# Patient Record
Sex: FEMALE | Race: WHITE | Employment: OTHER | URBAN - METROPOLITAN AREA
[De-identification: names, ages, dates, MRNs, and addresses within clinical notes are randomized per-mention and may not be internally consistent; named-entity substitution may affect disease eponyms.]

---

## 2017-01-09 DIAGNOSIS — E03.4 HYPOTHYROIDISM DUE TO ACQUIRED ATROPHY OF THYROID: ICD-10-CM

## 2017-01-09 RX ORDER — LEVOTHYROXINE SODIUM 0.07 MG/1
TABLET ORAL
Qty: 90 TAB | Refills: 4 | Status: SHIPPED | OUTPATIENT
Start: 2017-01-09 | End: 2017-02-01 | Stop reason: SDUPTHER

## 2017-01-09 NOTE — TELEPHONE ENCOUNTER
Was the patient seen in the last year in this department? No     Does patient have an active prescription for medications requested? No     Received Request Via: Patient     Patient has an appt scheduled for 2/1/17 but stated she will run out of medication before then.

## 2017-01-10 NOTE — TELEPHONE ENCOUNTER
Phone Number Called: [phone] (home)     Message: Patient notified of rx sent to pharmacy    Left Message for patient to call back: no

## 2017-01-11 ENCOUNTER — APPOINTMENT (OUTPATIENT)
Dept: MEDICAL GROUP | Age: 62
End: 2017-01-11
Payer: COMMERCIAL

## 2017-02-01 ENCOUNTER — OFFICE VISIT (OUTPATIENT)
Dept: MEDICAL GROUP | Age: 62
End: 2017-02-01
Payer: COMMERCIAL

## 2017-02-01 VITALS
OXYGEN SATURATION: 95 % | RESPIRATION RATE: 16 BRPM | WEIGHT: 136.4 LBS | TEMPERATURE: 98.3 F | DIASTOLIC BLOOD PRESSURE: 84 MMHG | HEART RATE: 77 BPM | SYSTOLIC BLOOD PRESSURE: 146 MMHG | HEIGHT: 66 IN | BODY MASS INDEX: 21.92 KG/M2

## 2017-02-01 DIAGNOSIS — E78.00 PURE HYPERCHOLESTEROLEMIA: ICD-10-CM

## 2017-02-01 DIAGNOSIS — Z00.00 HEALTHCARE MAINTENANCE: ICD-10-CM

## 2017-02-01 DIAGNOSIS — Z85.3 HX: BREAST CANCER: ICD-10-CM

## 2017-02-01 DIAGNOSIS — Z12.11 SCREEN FOR COLON CANCER: ICD-10-CM

## 2017-02-01 DIAGNOSIS — E03.4 HYPOTHYROIDISM DUE TO ACQUIRED ATROPHY OF THYROID: ICD-10-CM

## 2017-02-01 DIAGNOSIS — Z98.890 S/P COLONOSCOPY: ICD-10-CM

## 2017-02-01 PROCEDURE — 99396 PREV VISIT EST AGE 40-64: CPT | Performed by: INTERNAL MEDICINE

## 2017-02-01 RX ORDER — LEVOTHYROXINE SODIUM 0.07 MG/1
TABLET ORAL
Qty: 90 TAB | Refills: 4
Start: 2017-02-01 | End: 2018-04-05 | Stop reason: SDUPTHER

## 2017-02-01 ASSESSMENT — PATIENT HEALTH QUESTIONNAIRE - PHQ9: CLINICAL INTERPRETATION OF PHQ2 SCORE: 0

## 2017-02-01 NOTE — PROGRESS NOTES
Chief Complaint:     Taisha Franco is a 61 y.o. female who presents for annual exam    Pt has GYN provider: yes  Last colonoscopy:    Bone density test:N\A  Gardasil:N\A   Last Td:    Regular exercise: yes      She  has a past medical history of Breast cancer (CMS-HCC) (1991) and Hypothyroidism.  She  has past surgical history that includes mastectomy (1991) and chemotherapy, unspecified procedure.  Current Outpatient Prescriptions   Medication Sig Dispense Refill   • levothyroxine (SYNTHROID) 75 MCG Tab TAKE ONE TABLET BY MOUTH DAILY -GENERICFOR SYNTHROID 90 Tab 4     No current facility-administered medications for this visit.     Social History   Substance Use Topics   • Smoking status: Never Smoker    • Smokeless tobacco: Never Used   • Alcohol Use: Yes       Review Of Systems  Denies fever, chills, or sweats, unexplained weight changes  Skin: negative for rash, changing moles, abnormal pigmentation, hair or nail changes.  Eyes: negative for visual blurring, double vision, eye pain, floaters and discharge from eyes  Ears/Nose/Throat: negative for tinnitus, vertigo, oral or dental problem, hoarseness, frequent URI's, sinus trouble, persistent sore throat  Respiratory: negative for persistent cough, hemoptysis, dyspnea, wheezing  Cardiovascular: negative for palpitations, tachycardia, irregular heart beat, chest pain or pressure or peripheral edema.  Breast: Denies breast tenderness, mass,  changes in size or contour, or abnormal cyclic discomfort.  Gastrointestinal: negative for dysphagia or odynophagia, nausea, heartburn or reflux, abdominal pain, hemorrhoids, constipation or diarrhea, black stool or bloody stool  Genitourinary: negative for nocturia, dysuria, frequency, incontinence, abnormal vaginal discharge, dysparunia or abnormal vaginal bleeding  Musculoskeletal: negative for joint swelling and muscle pain/ soreness  Neurologic: negative for new or changing headaches, new weakness  "tremor  Psychiatric: negative for mood or sleep disturbance, anxiety, depression, sexual difficulties  Hematologic/Lymphatic/Immunologic: negative for pallor, unusual bruising, swollen glands, HIV risk factors  Endocrine: negative for temperature intolerance, polydipsia, polyuria.       PHYSICAL EXAMINATION:  Blood pressure 146/84, pulse 77, temperature 36.8 °C (98.3 °F), resp. rate 16, height 1.676 m (5' 5.98\"), weight 61.871 kg (136 lb 6.4 oz), SpO2 95 %.  Body mass index is 22.03 kg/(m^2).  Wt Readings from Last 4 Encounters:   02/01/17 61.871 kg (136 lb 6.4 oz)   11/11/15 61.326 kg (135 lb 3.2 oz)   09/18/14 62.869 kg (138 lb 9.6 oz)   04/15/14 62.143 kg (137 lb)       Constitutional: Alert, no distress.  Skin: Warm, dry, good turgor, no rashes or suspicious lesions in visible areas.  Eye: pupils equal, round and reactive to light, conjunctivae clear, lids normal.  ENMT: Lips without lesions, good dentition, oropharynx clear. Pinnae skin normal with no lesions. TM pearly gray with normal light reflex.   Neck: supple, no masses. No anterior or supraclavicular adenopathy. No carotid bruits no thyromegaly.  Respiratory: Unlabored respiratory effort, lungs clear to auscultation, no wheezes, no ronchi.  Cardiovascular: Normal S1, S2, no murmur, no peripheral edema.  Abdomen: no CVAT abdomen Soft, non-tender, no masses, no hepatosplenomegaly.  Psych: Alert and oriented x3, normal affect and mood.  Musc/Skel: 5/5 strength and normal motion UE and LE proximal and distal.        ASSESSMENT/PLAN:  1. Hypothyroidism due to acquired atrophy of thyroid -Under good control. Continue same regimen.   levothyroxine (SYNTHROID) 75 MCG Tab    TSH   2. S/P colonoscopy- neg DHA, 2007  REFERRAL TO GASTROENTEROLOGY   3. Screen for colon cancer  REFERRAL TO GASTROENTEROLOGY   4. HTN, white coat  -Under good control. Continue same regimen.      5. Pure hypercholesterolemia- mild no meds  -Under good control. Continue same regimen.   " LIPID PROFILE    CBC WITH DIFFERENTIAL    COMP METABOLIC PANEL   6. HX: breast cancer-1991; left mastectomy; 3 pos ax nodes; ER/HER +; chemo rx 6 mos, tamoxifen x 10 yr; no recurrence   -Under good control. Continue same regimen.       HCM:      Labs ordered  Immunizations per orders  Pt counseled about skin care, diet, supplements, and exercise.  Next office visit for recheck of chronic medical conditions is due in  1 year     30 minute face-to-face encounter took place today.  More than half of this time was spent in the coordination of care of the above problems, as well as counseling.

## 2017-02-01 NOTE — MR AVS SNAPSHOT
"        Taisha Franco   2017 10:40 AM   Office Visit   MRN: [id number]    Department:  [address] [place] [address]   Dept Phone:  [phone]    Description:  Female : 1955   Provider:  Celestino Harrison M.D.           Reason for Visit     Annual Exam preventative    Hypothyroidism evaluation of lab work results      Allergies as of 2017     No Known Allergies      You were diagnosed with     Hypothyroidism due to acquired atrophy of thyroid   [1208338]       S/P colonoscopy   [763146]       Screen for colon cancer   [944027]       HTN, white coat   [670409]       Pure hypercholesterolemia   [272.0.ICD-9-CM]       HX: breast cancer   [405422]         Vital Signs     Blood Pressure Pulse Temperature Respirations Height Weight    146/84 mmHg 77 36.8 °C (98.3 °F) 16 1.676 m (5' 5.98\") 61.871 kg (136 lb 6.4 oz)    Body Mass Index Oxygen Saturation Smoking Status             22.03 kg/m2 95% Never Smoker          Basic Information     Date Of Birth Sex Race Ethnicity Preferred Language    1955 Female White Unknown English      Your appointments     2018  2:40 PM   ANNUAL EXAM PREVENTATIVE with Celestino Harrison M.D.   [address] [address] [phone]              Problem List              ICD-10-CM Priority Class Noted - Resolved    Hypothyroidism due to acquired atrophy of thyroid E03.4   2013 - Present    Pure hypercholesterolemia- mild no meds E78.00   2013 - Present    HX: breast cancer-; left mastectomy; 3 pos ax nodes; ER/HER +; chemo rx 6 mos, tamoxifen x 10 yr; no recurrence Z85.3   2014 - Present    HTN, white coat R03.0   2015 - Present    S/P colonoscopy- neg DHA,  Z98.890   2015 - Present      Health Maintenance        Date Due Completion Dates    IMM DTaP/Tdap/Td Vaccine (1 - Tdap) 1974 ---    IMM ZOSTER VACCINE 2015 ---    IMM INFLUENZA (1) 2016 ---    COLONOSCOPY " 4/2/2017 4/2/2007    MAMMOGRAM 6/20/2017 6/20/2016, 6/17/2015, 6/16/2014, 6/26/2013, 6/26/2013, 6/26/2013, 6/26/2013, 6/26/2013, 6/14/2013, 6/13/2012, 6/26/2006, 6/23/2005, 6/11/2004    PAP SMEAR 6/12/2018 6/12/2015            Current Immunizations     No immunizations on file.      Below and/or attached are the medications your provider expects you to take. Review all of your home medications and newly ordered medications with your provider and/or pharmacist. Follow medication instructions as directed by your provider and/or pharmacist. Please keep your medication list with you and share with your provider. Update the information when medications are discontinued, doses are changed, or new medications (including over-the-counter products) are added; and carry medication information at all times in the event of emergency situations     Allergies:  No Known Allergies          Medications  Valid as of: February 01, 2017 - 11:19 AM    Generic Name Brand Name Tablet Size Instructions for use    Levothyroxine Sodium (Tab) SYNTHROID 75 MCG TAKE ONE TABLET BY MOUTH DAILY -GENERICFOR SYNTHROID        .                 Medicines prescribed today were sent to:     [id number] [place] [address] [facility] [place]    [address] [facility] [address]    Phone: [phone] Fax: [phone]    Open 24 Hours?: No      Medication refill instructions:       If your prescription bottle indicates you have medication refills left, it is not necessary to call your provider’s office. Please contact your pharmacy and they will refill your medication.    If your prescription bottle indicates you do not have any refills left, you may request refills at any time through one of the following ways: The online [employer] system (except Urgent Care), by calling your provider’s office, or by asking your pharmacy to contact your provider’s office with a refill request. Medication refills are processed only during regular business hours and may not be  available until the next business day. Your provider may request additional information or to have a follow-up visit with you prior to refilling your medication.   *Please Note: Medication refills are assigned a new Rx number when refilled electronically. Your pharmacy may indicate that no refills were authorized even though a new prescription for the same medication is available at the pharmacy. Please request the medicine by name with the pharmacy before contacting your provider for a refill.        Your To Do List     Future Labs/Procedures Complete By Expires    CBC WITH DIFFERENTIAL  As directed 2/1/2018    COMP METABOLIC PANEL  As directed 2/1/2018    LIPID PROFILE  As directed 2/1/2018    TSH  As directed 2/1/2018      Referral     A referral request has been sent to our patient care coordination department. Please allow 3-5 business days for us to process this request and contact you either by phone or mail. If you do not hear from us by the 5th business day, please call us at [phone].           [employer] Access Code: Activation code not generated  Current [employer] Status: Active

## 2017-06-26 ENCOUNTER — HOSPITAL ENCOUNTER (OUTPATIENT)
Dept: RADIOLOGY | Facility: MEDICAL CENTER | Age: 62
End: 2017-06-26
Attending: INTERNAL MEDICINE
Payer: COMMERCIAL

## 2017-06-26 DIAGNOSIS — Z12.31 ENCOUNTER FOR MAMMOGRAM TO ESTABLISH BASELINE MAMMOGRAM: ICD-10-CM

## 2017-06-26 PROCEDURE — G0202 SCR MAMMO BI INCL CAD: HCPCS

## 2017-11-07 ENCOUNTER — OFFICE VISIT (OUTPATIENT)
Dept: MEDICAL GROUP | Age: 62
End: 2017-11-07
Payer: COMMERCIAL

## 2017-11-07 VITALS
HEIGHT: 67 IN | TEMPERATURE: 98 F | WEIGHT: 140.4 LBS | DIASTOLIC BLOOD PRESSURE: 86 MMHG | BODY MASS INDEX: 22.03 KG/M2 | HEART RATE: 78 BPM | OXYGEN SATURATION: 100 % | SYSTOLIC BLOOD PRESSURE: 144 MMHG

## 2017-11-07 DIAGNOSIS — J01.80 ACUTE NON-RECURRENT SINUSITIS OF OTHER SINUS: Primary | ICD-10-CM

## 2017-11-07 DIAGNOSIS — Z23 NEED FOR VACCINATION: ICD-10-CM

## 2017-11-07 DIAGNOSIS — H01.139 ECZEMATOUS DERMATITIS OF EYELID, UNSPECIFIED LATERALITY: ICD-10-CM

## 2017-11-07 DIAGNOSIS — J30.9 ACUTE ALLERGIC RHINITIS, UNSPECIFIED SEASONALITY, UNSPECIFIED TRIGGER: ICD-10-CM

## 2017-11-07 PROCEDURE — 99214 OFFICE O/P EST MOD 30 MIN: CPT | Performed by: FAMILY MEDICINE

## 2017-11-07 RX ORDER — AMOXICILLIN AND CLAVULANATE POTASSIUM 875; 125 MG/1; MG/1
1 TABLET, FILM COATED ORAL 2 TIMES DAILY
Qty: 10 TAB | Refills: 0 | Status: SHIPPED | OUTPATIENT
Start: 2017-11-07 | End: 2017-11-12

## 2017-11-07 NOTE — PROGRESS NOTES
"Subjective:   CC: sinus pain    HPI:     Taisha Franco is a 62 y.o. female, established patient of the clinic, presents with the following concerns:     Pt presents with 6 weeks hx of nasal congestion, nasal discharge, eyelid itch, sinus pressure, and mild cough without fever, chills, ear pain, ear discharge, SOB, [place]. She tried saline and OTC Claritin D without relief. Couple days ago, she develops acute bilateral maxillary and frontal sinus tenderness. Pain is described as ache, 4/10, nothing make it worse or better. OTC analgesics failed to improve her symptoms.     Pt also c/o dry, itchy, and mildly erythematous eyelids. Denies changes in vision or trauma to the eyes.     Current medicines (including changes today)  Current Outpatient Prescriptions   Medication Sig Dispense Refill   • amoxicillin-clavulanate (AUGMENTIN) 875-125 MG Tab Take 1 Tab by mouth 2 times a day for 5 days. 10 Tab 0   • hydrocortisone 2.5 % Ointment Apply to affected area twice daily 30 g 0   • levothyroxine (SYNTHROID) 75 MCG Tab TAKE ONE TABLET BY MOUTH DAILY -GENERICFOR SYNTHROID 90 Tab 4     No current facility-administered medications for this visit.      She  has a past medical history of Breast cancer (CMS-HCC) (1991) and Hypothyroidism.    I personally reviewed patient's problem list, allergies, medications, family hx, social hx with patient and update EPIC.     REVIEW OF SYSTEMS:  CONSTITUTIONAL:  Denies night sweats, fatigue, malaise, lethargy, fever or chills.  RESPIRATORY:  Denies cough, wheeze, hemoptysis, or shortness of breath.  CARDIOVASCULAR:  Denies chest pains, palpitations, pedal edema     Objective:     Blood pressure 144/86, pulse 78, temperature 36.7 °C (98 °F), height 1.689 m (5' 6.5\"), weight 63.7 kg (140 lb 6.4 oz), SpO2 100 %. Body mass index is 22.32 kg/m².    Physical Exam:  Constitutional: awake, alert, in no distress.  Skin: Warm, dry, good turgor, no rashes, bruises, ulcers in visible areas.  - mild " bilateral upper and lower eyelids erythema, dry, flaky skin. No e/o trama or bruises.   Eye: conjunctiva clear, lids neg for edema or lesions.  ENMT: TM and auditory canals wnl. inflamed nasal mucosa with mild mucoid discharged. Lips without lesions, good dentition, hyperemic oropharynx w/o tonsillar hypertrophy. Maxillary and frontal sinuses are tender to percussion.   Neck: Trachea midline, no masses, no thyromegaly. No cervical or supraclavicular lymphadenopathy  Respiratory: Unlabored respiratory effort, lungs clear to auscultation, no wheezes, no rhonchi, no rales.  Cardiovascular: Normal S1, S2, no murmur, no pedal edema.  Psych: Oriented x3, affect and mood wnl, intact judgement and insight.       Assessment and Plan:   The following treatment plan was discussed    1. Acute sinusitis   Hx and exam consistent with acute sinusitis, poorly treated allergic rhinitis might be contributing to symptoms. Plan:   - amoxicillin-clavulanate (AUGMENTIN) 875-125 MG Tab; Take 1 Tab by mouth 2 times a day for 5 days.  Dispense: 10 Tab; Refill: 0  - treat AR as below    2. Eczematous dermatitis of eyelids, unspecified laterality  - hydrocortisone 2.5 % Ointment; Apply to affected area twice daily  Dispense: 30 g; Refill: 0  - emollient, cool compresses     3. Acute allergic rhinitis, unspecified seasonality, unspecified trigger  · Nasal saline irrigation 2-3 times per day to reduce allergen load  · Over-the-counter nasal steroid (Flonase, NasoNex)   · Over-the-counter anti-histamine PRN (Claritin, Allegra, etc)  · Home humidifier/air filter  · Trigger avoidance  · Follow up PRN    Andreea Chambers M.D.      Followup: Return for As needed.    Please note that this dictation was created using voice recognition software. I have made every reasonable attempt to correct obvious errors, but I expect that there are errors of grammar and possibly content that I did not discover before finalizing the note.

## 2017-11-18 LAB
ALBUMIN SERPL-MCNC: 4.5 G/DL (ref 3.6–4.8)
ALBUMIN/GLOB SERPL: 1.7 {RATIO} (ref 1.2–2.2)
ALP SERPL-CCNC: 61 IU/L (ref 39–117)
ALT SERPL-CCNC: 19 IU/L (ref 0–32)
AST SERPL-CCNC: 30 IU/L (ref 0–40)
BASOPHILS # BLD AUTO: 0.1 X10E3/UL (ref 0–0.2)
BASOPHILS NFR BLD AUTO: 2 %
BILIRUB SERPL-MCNC: 0.7 MG/DL (ref 0–1.2)
BUN SERPL-MCNC: 15 MG/DL (ref 8–27)
BUN/CREAT SERPL: 17 (ref 12–28)
CALCIUM SERPL-MCNC: 9.7 MG/DL (ref 8.7–10.3)
CHLORIDE SERPL-SCNC: 100 MMOL/L (ref 96–106)
CHOLEST SERPL-MCNC: 224 MG/DL (ref 100–199)
CO2 SERPL-SCNC: 24 MMOL/L (ref 18–29)
COMMENT 011824: ABNORMAL
CREAT SERPL-MCNC: 0.87 MG/DL (ref 0.57–1)
EOSINOPHIL # BLD AUTO: 0.1 X10E3/UL (ref 0–0.4)
EOSINOPHIL NFR BLD AUTO: 3 %
ERYTHROCYTE [DISTWIDTH] IN BLOOD BY AUTOMATED COUNT: 13.2 % (ref 12.3–15.4)
GFR SERPLBLD CREATININE-BSD FMLA CKD-EPI: 72 ML/MIN/1.73
GFR SERPLBLD CREATININE-BSD FMLA CKD-EPI: 83 ML/MIN/1.73
GLOBULIN SER CALC-MCNC: 2.7 G/DL (ref 1.5–4.5)
GLUCOSE SERPL-MCNC: 89 MG/DL (ref 65–99)
HCT VFR BLD AUTO: 48.1 % (ref 34–46.6)
HDLC SERPL-MCNC: 89 MG/DL
HGB BLD-MCNC: 15.6 G/DL (ref 11.1–15.9)
IMM GRANULOCYTES # BLD: 0 X10E3/UL (ref 0–0.1)
IMM GRANULOCYTES NFR BLD: 0 %
IMMATURE CELLS  115398: ABNORMAL
LDLC SERPL CALC-MCNC: 118 MG/DL (ref 0–99)
LYMPHOCYTES # BLD AUTO: 1.7 X10E3/UL (ref 0.7–3.1)
LYMPHOCYTES NFR BLD AUTO: 48 %
MCH RBC QN AUTO: 32.2 PG (ref 26.6–33)
MCHC RBC AUTO-ENTMCNC: 32.4 G/DL (ref 31.5–35.7)
MCV RBC AUTO: 99 FL (ref 79–97)
MONOCYTES # BLD AUTO: 0.3 X10E3/UL (ref 0.1–0.9)
MONOCYTES NFR BLD AUTO: 8 %
MORPHOLOGY BLD-IMP: ABNORMAL
NEUTROPHILS # BLD AUTO: 1.3 X10E3/UL (ref 1.4–7)
NEUTROPHILS NFR BLD AUTO: 39 %
NRBC BLD AUTO-RTO: ABNORMAL %
PLATELET # BLD AUTO: 281 X10E3/UL (ref 150–379)
POTASSIUM SERPL-SCNC: 4.3 MMOL/L (ref 3.5–5.2)
PROT SERPL-MCNC: 7.2 G/DL (ref 6–8.5)
RBC # BLD AUTO: 4.84 X10E6/UL (ref 3.77–5.28)
SODIUM SERPL-SCNC: 139 MMOL/L (ref 134–144)
TRIGL SERPL-MCNC: 86 MG/DL (ref 0–149)
TSH SERPL DL<=0.005 MIU/L-ACNC: 1.97 UIU/ML (ref 0.45–4.5)
VLDLC SERPL CALC-MCNC: 17 MG/DL (ref 5–40)
WBC # BLD AUTO: 3.4 X10E3/UL (ref 3.4–10.8)

## 2017-11-21 ENCOUNTER — TELEPHONE (OUTPATIENT)
Dept: MEDICAL GROUP | Age: 62
End: 2017-11-21

## 2017-11-21 RX ORDER — DOXYCYCLINE HYCLATE 100 MG
100 TABLET ORAL 2 TIMES DAILY
Qty: 14 TAB | Refills: 0 | Status: SHIPPED | OUTPATIENT
Start: 2017-11-21 | End: 2017-11-28

## 2017-11-21 NOTE — TELEPHONE ENCOUNTER
1. Caller Name: Taisha Franco                                           Call Back Number: [phone] (home)         Patient approves a detailed voicemail message: N\A    Patient called and stated that her sinus infection was getting better and then it took a turn for the worse. She believes she needs more antibiotics but she thinks she needs a stronger dose.  Please advise.

## 2017-11-22 ENCOUNTER — APPOINTMENT (RX ONLY)
Dept: URBAN - METROPOLITAN AREA CLINIC 20 | Facility: CLINIC | Age: 62
Setting detail: DERMATOLOGY
End: 2017-11-22

## 2017-11-22 DIAGNOSIS — L71.0 PERIORAL DERMATITIS: ICD-10-CM

## 2017-11-22 DIAGNOSIS — L82.1 OTHER SEBORRHEIC KERATOSIS: ICD-10-CM

## 2017-11-22 DIAGNOSIS — D22 MELANOCYTIC NEVI: ICD-10-CM

## 2017-11-22 DIAGNOSIS — L81.4 OTHER MELANIN HYPERPIGMENTATION: ICD-10-CM

## 2017-11-22 DIAGNOSIS — D18.0 HEMANGIOMA: ICD-10-CM

## 2017-11-22 PROBLEM — D22.72 MELANOCYTIC NEVI OF LEFT LOWER LIMB, INCLUDING HIP: Status: ACTIVE | Noted: 2017-11-22

## 2017-11-22 PROBLEM — D22.71 MELANOCYTIC NEVI OF RIGHT LOWER LIMB, INCLUDING HIP: Status: ACTIVE | Noted: 2017-11-22

## 2017-11-22 PROBLEM — D22.62 MELANOCYTIC NEVI OF LEFT UPPER LIMB, INCLUDING SHOULDER: Status: ACTIVE | Noted: 2017-11-22

## 2017-11-22 PROBLEM — Z85.3 PERSONAL HISTORY OF MALIGNANT NEOPLASM OF BREAST: Status: ACTIVE | Noted: 2017-11-22

## 2017-11-22 PROBLEM — D22.5 MELANOCYTIC NEVI OF TRUNK: Status: ACTIVE | Noted: 2017-11-22

## 2017-11-22 PROBLEM — D22.39 MELANOCYTIC NEVI OF OTHER PARTS OF FACE: Status: ACTIVE | Noted: 2017-11-22

## 2017-11-22 PROBLEM — D22.61 MELANOCYTIC NEVI OF RIGHT UPPER LIMB, INCLUDING SHOULDER: Status: ACTIVE | Noted: 2017-11-22

## 2017-11-22 PROBLEM — D18.01 HEMANGIOMA OF SKIN AND SUBCUTANEOUS TISSUE: Status: ACTIVE | Noted: 2017-11-22

## 2017-11-22 PROBLEM — E03.9 HYPOTHYROIDISM, UNSPECIFIED: Status: ACTIVE | Noted: 2017-11-22

## 2017-11-22 PROCEDURE — 99214 OFFICE O/P EST MOD 30 MIN: CPT

## 2017-11-22 PROCEDURE — ? SUNSCREEN RECOMMENDATIONS

## 2017-11-22 PROCEDURE — ? ADDITIONAL NOTES

## 2017-11-22 PROCEDURE — ? COUNSELING

## 2017-11-22 PROCEDURE — ? PRESCRIPTION SAMPLES PROVIDED

## 2017-11-22 ASSESSMENT — LOCATION ZONE DERM
LOCATION ZONE: FACE
LOCATION ZONE: LEG
LOCATION ZONE: EYELID
LOCATION ZONE: TRUNK
LOCATION ZONE: ARM

## 2017-11-22 ASSESSMENT — LOCATION SIMPLE DESCRIPTION DERM
LOCATION SIMPLE: RIGHT CHEEK
LOCATION SIMPLE: RIGHT FOREARM
LOCATION SIMPLE: UPPER BACK
LOCATION SIMPLE: RIGHT POSTERIOR THIGH
LOCATION SIMPLE: RIGHT INFERIOR EYELID
LOCATION SIMPLE: LEFT POSTERIOR UPPER ARM
LOCATION SIMPLE: LEFT INFERIOR EYELID
LOCATION SIMPLE: RIGHT FOREHEAD
LOCATION SIMPLE: RIGHT POSTERIOR UPPER ARM
LOCATION SIMPLE: RIGHT PRETIBIAL REGION
LOCATION SIMPLE: LEFT FOREARM
LOCATION SIMPLE: LEFT POSTERIOR THIGH
LOCATION SIMPLE: RIGHT UPPER BACK
LOCATION SIMPLE: RIGHT LOWER BACK
LOCATION SIMPLE: LEFT PRETIBIAL REGION

## 2017-11-22 ASSESSMENT — LOCATION DETAILED DESCRIPTION DERM
LOCATION DETAILED: LEFT VENTRAL PROXIMAL FOREARM
LOCATION DETAILED: RIGHT LATERAL INFERIOR EYELID
LOCATION DETAILED: INFERIOR THORACIC SPINE
LOCATION DETAILED: RIGHT INFERIOR FOREHEAD
LOCATION DETAILED: RIGHT PROXIMAL PRETIBIAL REGION
LOCATION DETAILED: RIGHT INFERIOR MEDIAL MIDBACK
LOCATION DETAILED: LEFT DISTAL POSTERIOR THIGH
LOCATION DETAILED: RIGHT INFERIOR MEDIAL UPPER BACK
LOCATION DETAILED: RIGHT INFERIOR CENTRAL MALAR CHEEK
LOCATION DETAILED: RIGHT VENTRAL PROXIMAL FOREARM
LOCATION DETAILED: LEFT LATERAL INFERIOR PRESEPTAL REGION
LOCATION DETAILED: LEFT LATERAL PROXIMAL PRETIBIAL REGION
LOCATION DETAILED: RIGHT DISTAL POSTERIOR UPPER ARM
LOCATION DETAILED: RIGHT DISTAL POSTERIOR THIGH
LOCATION DETAILED: LEFT PROXIMAL POSTERIOR UPPER ARM
LOCATION DETAILED: RIGHT SUPERIOR UPPER BACK

## 2017-11-22 NOTE — PROCEDURE: ADDITIONAL NOTES
Additional Notes: pt states she is taking oral antibiotics prescribed by PCP with some improvement noted.

## 2018-04-05 ENCOUNTER — OFFICE VISIT (OUTPATIENT)
Dept: MEDICAL GROUP | Age: 63
End: 2018-04-05
Payer: COMMERCIAL

## 2018-04-05 VITALS
DIASTOLIC BLOOD PRESSURE: 80 MMHG | BODY MASS INDEX: 21.91 KG/M2 | HEART RATE: 98 BPM | SYSTOLIC BLOOD PRESSURE: 130 MMHG | TEMPERATURE: 99.1 F | OXYGEN SATURATION: 99 % | HEIGHT: 67 IN | WEIGHT: 139.6 LBS

## 2018-04-05 DIAGNOSIS — Z85.3 HX: BREAST CANCER: ICD-10-CM

## 2018-04-05 DIAGNOSIS — E78.00 PURE HYPERCHOLESTEROLEMIA: ICD-10-CM

## 2018-04-05 DIAGNOSIS — Z00.00 ROUTINE GENERAL MEDICAL EXAMINATION AT A HEALTH CARE FACILITY: ICD-10-CM

## 2018-04-05 DIAGNOSIS — I10 ESSENTIAL HYPERTENSION: ICD-10-CM

## 2018-04-05 DIAGNOSIS — E03.4 HYPOTHYROIDISM DUE TO ACQUIRED ATROPHY OF THYROID: ICD-10-CM

## 2018-04-05 PROCEDURE — 99396 PREV VISIT EST AGE 40-64: CPT | Performed by: INTERNAL MEDICINE

## 2018-04-05 RX ORDER — LEVOTHYROXINE SODIUM 0.07 MG/1
TABLET ORAL
Qty: 90 TAB | Refills: 4 | Status: SHIPPED | OUTPATIENT
Start: 2018-04-05 | End: 2019-05-08 | Stop reason: SDUPTHER

## 2018-04-05 ASSESSMENT — PATIENT HEALTH QUESTIONNAIRE - PHQ9: CLINICAL INTERPRETATION OF PHQ2 SCORE: 0

## 2018-04-05 ASSESSMENT — ENCOUNTER SYMPTOMS
PSYCHIATRIC NEGATIVE: 1
CONSTITUTIONAL NEGATIVE: 1
GASTROINTESTINAL NEGATIVE: 1
RESPIRATORY NEGATIVE: 1
CARDIOVASCULAR NEGATIVE: 1
EYES NEGATIVE: 1
MUSCULOSKELETAL NEGATIVE: 1
NEUROLOGICAL NEGATIVE: 1

## 2018-04-05 NOTE — PROGRESS NOTES
"Subjective:      Taisha Franco is a 62 y.o. female who presents with Annual Exam and Results  and  The patient is here for followup of chronic medical problems listed below. The patient is compliant with medications and having no side effects from them. Denies chest pain, abdominal pain, dyspnea, myalgias, or cough.   Patient Active Problem List   Diagnosis   • Hypothyroidism due to acquired atrophy of thyroid   • Pure hypercholesterolemia- mild no meds   • HX: breast cancer-1991; left mastectomy; 3 pos ax nodes; ER/HER +; chemo rx 6 mos, tamoxifen x 10 yr; no recurrence   • Essential hypertension- borderline, no meds, white coat   No Known Allergies              HPI    Review of Systems   Constitutional: Negative.    HENT: Negative.    Eyes: Negative.    Respiratory: Negative.    Cardiovascular: Negative.    Gastrointestinal: Negative.    Genitourinary: Negative.    Musculoskeletal: Negative.    Skin: Negative.    Neurological: Negative.    Endo/Heme/Allergies: Negative.    Psychiatric/Behavioral: Negative.           Objective:     /80   Pulse 98   Temp 37.3 °C (99.1 °F)   Ht 1.689 m (5' 6.5\")   Wt 63.3 kg (139 lb 9.6 oz)   SpO2 99%   BMI 22.19 kg/m²      Physical Exam   Constitutional: She is oriented to person, place, and time. She appears well-developed and well-nourished. No distress.   HENT:   Head: Normocephalic and atraumatic.   Right Ear: External ear normal.   Left Ear: External ear normal.   Nose: Nose normal.   Mouth/Throat: Oropharynx is clear and moist. No oropharyngeal exudate.   Eyes: Conjunctivae and EOM are normal. Pupils are equal, round, and reactive to light. Right eye exhibits no discharge. Left eye exhibits no discharge. No scleral icterus.   Neck: Normal range of motion. Neck supple. No JVD present. No tracheal deviation present. No thyromegaly present.   Cardiovascular: Normal rate, regular rhythm, normal heart sounds and intact distal pulses.  Exam reveals no gallop and " no friction rub.    No murmur heard.  Pulmonary/Chest: Effort normal and breath sounds normal. No stridor. No respiratory distress. She has no wheezes. She has no rales. She exhibits no tenderness.   Abdominal: Soft. Bowel sounds are normal. She exhibits no distension and no mass. There is no tenderness. There is no rebound and no guarding.   Musculoskeletal: Normal range of motion. She exhibits no edema or tenderness.   Lymphadenopathy:     She has no cervical adenopathy.   Neurological: She is alert and oriented to person, place, and time. She has normal reflexes. She displays normal reflexes. No cranial nerve deficit. She exhibits normal muscle tone. Coordination normal.   Skin: Skin is warm and dry. No rash noted. She is not diaphoretic. No erythema. No pallor.   Psychiatric: She has a normal mood and affect. Her behavior is normal. Judgment and thought content normal.   Vitals reviewed.    No visits with results within 1 Month(s) from this visit.   Latest known visit with results is:   Orders Only on 11/17/2017   Component Date Value   • WBC 11/17/2017 3.4    • RBC 11/17/2017 4.84    • Hemoglobin 11/17/2017 15.6    • Hematocrit 11/17/2017 48.1*   • MCV 11/17/2017 99*   • MCH 11/17/2017 32.2    • MCHC 11/17/2017 32.4    • RDW 11/17/2017 13.2    • Platelet Count 11/17/2017 281    • Neutrophils-Polys 11/17/2017 39    • Lymphocytes 11/17/2017 48    • Monocytes 11/17/2017 8    • Eosinophils 11/17/2017 3    • Basophils 11/17/2017 2    • Immature Cells 11/17/2017 CANCELED    • Neutrophils (Absolute) 11/17/2017 1.3*   • Lymphs (Absolute) 11/17/2017 1.7    • Monos (Absolute) 11/17/2017 0.3    • Eos (Absolute) 11/17/2017 0.1    • Baso (Absolute) 11/17/2017 0.1    • Immature Granulocytes 11/17/2017 0    • Immature Granulocytes (a* 11/17/2017 0.0    • Nucleated RBC 11/17/2017 CANCELED    • Comments-Diff 11/17/2017 CANCELED    • Glucose 11/17/2017 89    • Bun 11/17/2017 15    • Creatinine 11/17/2017 0.87    • GFR If Non   [employer]meri* 11/17/2017 72    • GFR If  11/17/2017 83    • Bun-Creatinine Ratio 11/17/2017 17    • Sodium 11/17/2017 139    • Potassium 11/17/2017 4.3    • Chloride 11/17/2017 100    • Co2 11/17/2017 24    • Calcium 11/17/2017 9.7    • Total Protein 11/17/2017 7.2    • Albumin 11/17/2017 4.5    • Globulin 11/17/2017 2.7    • A-G Ratio 11/17/2017 1.7    • Total Bilirubin 11/17/2017 0.7    • Alkaline Phosphatase 11/17/2017 61    • AST(SGOT) 11/17/2017 30    • ALT(SGPT) 11/17/2017 19    • Cholesterol,Tot 11/17/2017 224*   • Triglycerides 11/17/2017 86    • HDL 11/17/2017 89    • VLDL Cholesterol Calc 11/17/2017 17    • LDL 11/17/2017 118*   • Comment: 11/17/2017 CANCELED    • TSH 11/17/2017 1.970       No results found for: HBA1C  Lab Results   Component Value Date/Time    SODIUM 139 11/17/2017 07:34 AM    POTASSIUM 4.3 11/17/2017 07:34 AM    CHLORIDE 100 11/17/2017 07:34 AM    CO2 24 11/17/2017 07:34 AM    GLUCOSE 89 11/17/2017 07:34 AM    BUN 15 11/17/2017 07:34 AM    CREATININE 0.87 11/17/2017 07:34 AM    BUNCREATRAT 17 11/17/2017 07:34 AM    ALKPHOSPHAT 61 11/17/2017 07:34 AM    ASTSGOT 30 11/17/2017 07:34 AM    ALTSGPT 19 11/17/2017 07:34 AM    TBILIRUBIN 0.7 11/17/2017 07:34 AM     No results found for: INR  Lab Results   Component Value Date/Time    CHOLSTRLTOT 224 (H) 11/17/2017 07:34 AM     (H) 11/17/2017 07:34 AM    HDL 89 11/17/2017 07:34 AM    TRIGLYCERIDE 86 11/17/2017 07:34 AM       No results found for: TESTOSTERONE  Lab Results   Component Value Date/Time    TSH 1.970 11/17/2017 07:34 AM    TSH 1.250 10/21/2016 07:30 AM     No results found for: FREET4  No results found for: URICACID  No components found for: VITB12  Lab Results   Component Value Date/Time    25HYDROXY 44.4 09/12/2014 07:09 AM                  Assessment/Plan:     1. Routine general medical examination at a health care facility   WNL  2. Hypothyroidism due to acquired atrophy of thyroid   Under good control.  Continue same regimen.    - levothyroxine (SYNTHROID) 75 MCG Tab; TAKE ONE TABLET BY MOUTH DAILY -GENERICFOR SYNTHROID  Dispense: 90 Tab; Refill: 4  - TSH; Future    3. Pure hypercholesterolemia- mild no meds   Under good control. Continue same regimen.    - COMP METABOLIC PANEL; Future  - LIPID PROFILE; Future  - CBC WITH DIFFERENTIAL; Future    4. Essential hypertension- borderline, no meds, white coat   Under good control. Continue same regimen.      5. HX: breast cancer-1991; left mastectomy; 3 pos ax nodes; ER/HER +; chemo rx 6 mos, tamoxifen x 10 yr; no recurrence       Under good control. Continue same regimen.      30 minute face-to-face encounter took place today.  More than half of this time was spent in the coordination of care of the above problems, as well as counseling.

## 2018-07-05 ENCOUNTER — HOSPITAL ENCOUNTER (OUTPATIENT)
Dept: RADIOLOGY | Facility: MEDICAL CENTER | Age: 63
End: 2018-07-05
Attending: INTERNAL MEDICINE
Payer: COMMERCIAL

## 2018-07-05 DIAGNOSIS — Z12.31 SCREENING MAMMOGRAM, ENCOUNTER FOR: ICD-10-CM

## 2018-07-05 PROCEDURE — 77063 BREAST TOMOSYNTHESIS BI: CPT

## 2019-04-05 LAB
ALBUMIN SERPL-MCNC: 4.5 G/DL (ref 3.6–4.8)
ALBUMIN/GLOB SERPL: 1.7 {RATIO} (ref 1.2–2.2)
ALP SERPL-CCNC: 64 IU/L (ref 39–117)
ALT SERPL-CCNC: 15 IU/L (ref 0–32)
AST SERPL-CCNC: 26 IU/L (ref 0–40)
BASOPHILS # BLD AUTO: 0 X10E3/UL (ref 0–0.2)
BASOPHILS NFR BLD AUTO: 1 %
BILIRUB SERPL-MCNC: 0.7 MG/DL (ref 0–1.2)
BUN SERPL-MCNC: 14 MG/DL (ref 8–27)
BUN/CREAT SERPL: 15 (ref 12–28)
CALCIUM SERPL-MCNC: 9.7 MG/DL (ref 8.7–10.3)
CHLORIDE SERPL-SCNC: 104 MMOL/L (ref 96–106)
CHOLEST SERPL-MCNC: 219 MG/DL (ref 100–199)
CO2 SERPL-SCNC: 20 MMOL/L (ref 20–29)
CREAT SERPL-MCNC: 0.91 MG/DL (ref 0.57–1)
EOSINOPHIL # BLD AUTO: 0.1 X10E3/UL (ref 0–0.4)
EOSINOPHIL NFR BLD AUTO: 3 %
ERYTHROCYTE [DISTWIDTH] IN BLOOD BY AUTOMATED COUNT: 13.6 % (ref 12.3–15.4)
GLOBULIN SER CALC-MCNC: 2.6 G/DL (ref 1.5–4.5)
GLUCOSE SERPL-MCNC: 88 MG/DL (ref 65–99)
HCT VFR BLD AUTO: 47.5 % (ref 34–46.6)
HDLC SERPL-MCNC: 96 MG/DL
HGB BLD-MCNC: 16.1 G/DL (ref 11.1–15.9)
IMM GRANULOCYTES # BLD AUTO: 0 X10E3/UL (ref 0–0.1)
IMM GRANULOCYTES NFR BLD AUTO: 0 %
IMMATURE CELLS  115398: ABNORMAL
LABORATORY COMMENT REPORT: ABNORMAL
LDLC SERPL CALC-MCNC: 109 MG/DL (ref 0–99)
LYMPHOCYTES # BLD AUTO: 1.7 X10E3/UL (ref 0.7–3.1)
LYMPHOCYTES NFR BLD AUTO: 37 %
MCH RBC QN AUTO: 32.5 PG (ref 26.6–33)
MCHC RBC AUTO-ENTMCNC: 33.9 G/DL (ref 31.5–35.7)
MCV RBC AUTO: 96 FL (ref 79–97)
MONOCYTES # BLD AUTO: 0.5 X10E3/UL (ref 0.1–0.9)
MONOCYTES NFR BLD AUTO: 10 %
MORPHOLOGY BLD-IMP: ABNORMAL
NEUTROPHILS # BLD AUTO: 2.2 X10E3/UL (ref 1.4–7)
NEUTROPHILS NFR BLD AUTO: 49 %
NRBC BLD AUTO-RTO: ABNORMAL %
PLATELET # BLD AUTO: 270 X10E3/UL (ref 150–379)
POTASSIUM SERPL-SCNC: 4.5 MMOL/L (ref 3.5–5.2)
PROT SERPL-MCNC: 7.1 G/DL (ref 6–8.5)
RBC # BLD AUTO: 4.95 X10E6/UL (ref 3.77–5.28)
SODIUM SERPL-SCNC: 140 MMOL/L (ref 134–144)
TRIGL SERPL-MCNC: 68 MG/DL (ref 0–149)
TSH SERPL DL<=0.005 MIU/L-ACNC: 1.83 UIU/ML (ref 0.45–4.5)
VLDLC SERPL CALC-MCNC: 14 MG/DL (ref 5–40)
WBC # BLD AUTO: 4.5 X10E3/UL (ref 3.4–10.8)

## 2019-05-08 ENCOUNTER — OFFICE VISIT (OUTPATIENT)
Dept: MEDICAL GROUP | Age: 64
End: 2019-05-08
Payer: COMMERCIAL

## 2019-05-08 VITALS
DIASTOLIC BLOOD PRESSURE: 104 MMHG | HEART RATE: 72 BPM | TEMPERATURE: 99.2 F | WEIGHT: 137.6 LBS | OXYGEN SATURATION: 98 % | SYSTOLIC BLOOD PRESSURE: 162 MMHG | HEIGHT: 66 IN | BODY MASS INDEX: 22.11 KG/M2

## 2019-05-08 DIAGNOSIS — Z85.3 HX: BREAST CANCER: ICD-10-CM

## 2019-05-08 DIAGNOSIS — I10 ESSENTIAL HYPERTENSION: ICD-10-CM

## 2019-05-08 DIAGNOSIS — E03.4 HYPOTHYROIDISM DUE TO ACQUIRED ATROPHY OF THYROID: ICD-10-CM

## 2019-05-08 DIAGNOSIS — R10.32 LLQ ABDOMINAL PAIN: ICD-10-CM

## 2019-05-08 DIAGNOSIS — Z00.00 ROUTINE GENERAL MEDICAL EXAMINATION AT A HEALTH CARE FACILITY: ICD-10-CM

## 2019-05-08 DIAGNOSIS — R10.30 LOWER ABDOMINAL PAIN: ICD-10-CM

## 2019-05-08 DIAGNOSIS — E78.00 PURE HYPERCHOLESTEROLEMIA: ICD-10-CM

## 2019-05-08 DIAGNOSIS — R03.0 ELEVATED BLOOD PRESSURE READING IN OFFICE WITH WHITE COAT SYNDROME, WITHOUT DIAGNOSIS OF HYPERTENSION: ICD-10-CM

## 2019-05-08 PROCEDURE — 99396 PREV VISIT EST AGE 40-64: CPT | Performed by: INTERNAL MEDICINE

## 2019-05-08 RX ORDER — LEVOTHYROXINE SODIUM 0.07 MG/1
TABLET ORAL
Qty: 90 TAB | Refills: 4 | Status: SHIPPED | OUTPATIENT
Start: 2019-05-08

## 2019-05-08 ASSESSMENT — ENCOUNTER SYMPTOMS
CONSTITUTIONAL NEGATIVE: 1
MUSCULOSKELETAL NEGATIVE: 1
CARDIOVASCULAR NEGATIVE: 1
NEUROLOGICAL NEGATIVE: 1
EYES NEGATIVE: 1
RESPIRATORY NEGATIVE: 1
ABDOMINAL PAIN: 1
PSYCHIATRIC NEGATIVE: 1

## 2019-05-08 NOTE — PROGRESS NOTES
Subjective:      Taisha Franco is a 63 y.o. female who presents with Annual Exam and Groin Pain        HPI    The patient is here for followup of chronic medical problems listed below. The patient is compliant with medications and having no side effects from them. Denies chest pain, abdominal pain, dyspnea, myalgias, or cough.    Patient complains of left lower quadrant pain for 2-3 months. She notes that she sometimes feels it while doing yoga or in certain positions. She states she went on an 8 mile run today and did not feel it, but sometimes does when she sits down. She denies vaginal bleeding.    Patient Active Problem List   Diagnosis   • Hypothyroidism due to acquired atrophy of thyroid   • Pure hypercholesterolemia- mild no meds   • HX: breast cancer-1991; left mastectomy; 3 pos ax nodes; ER/HER +; chemo rx 6 mos, tamoxifen x 10 yr; no recurrence   • Essential hypertension- borderline, no meds, white coat   • LLQ abdominal pain   • Elevated blood pressure reading in office with white coat syndrome, without diagnosis of hypertension       Outpatient Medications Prior to Visit   Medication Sig Dispense Refill   • levothyroxine (SYNTHROID) 75 MCG Tab TAKE ONE TABLET BY MOUTH DAILY -GENERICFOR SYNTHROID 90 Tab 4   • hydrocortisone 2.5 % Ointment Apply to affected area twice daily 30 g 0     No facility-administered medications prior to visit.         No Known Allergies    Review of Systems   Constitutional: Negative.    HENT: Negative.    Eyes: Negative.    Respiratory: Negative.    Cardiovascular: Negative.    Gastrointestinal: Positive for abdominal pain.   Genitourinary: Negative.    Musculoskeletal: Negative.    Skin: Negative.    Neurological: Negative.    Endo/Heme/Allergies: Negative.    Psychiatric/Behavioral: Negative.    All other systems reviewed and are negative.           Objective:     BP (!) 162/104 (BP Location: Right arm, Patient Position: Sitting, BP Cuff Size: Adult)   Pulse 72   Temp  "37.3 °C (99.2 °F) (Temporal)   Ht 1.676 m (5' 6\")   Wt 62.4 kg (137 lb 9.6 oz)   SpO2 98%   BMI 22.21 kg/m²     Physical Exam   Constitutional: Oriented to person, place, and time. Appears well-developed and well-nourished. No distress.   Head: Normocephalic and atraumatic.   Right Ear: External ear normal.   Left Ear: External ear normal.   Nose: Nose normal.   Mouth/Throat: Oropharynx is clear and moist. No oropharyngeal exudate.   Eyes: Pupils are equal, round, and reactive to light. Conjunctivae and EOM are normal. Right eye exhibits no discharge. Left eye exhibits no discharge. No scleral icterus.   Neck: Normal range of motion. Neck supple. No JVD present. No tracheal deviation present. No thyromegaly present.   Cardiovascular: Normal rate, regular rhythm, normal heart sounds and intact distal pulses.  Exam reveals no gallop and no friction rub.    No murmur heard.  Pulmonary/Chest: Effort normal. No stridor. No respiratory distress. No wheezing or rales. No tenderness.   Abdominal: Soft. Bowel sounds are normal. No distension and no mass. There is no tenderness. There is no rebound and no guarding. No hernia.   Musculoskeletal: Normal range of motion No edema or tenderness.   Lymphadenopathy: No cervical adenopathy.   Neurological: Alert and oriented to person, place, and time. Normal reflexes. Normal reflexes. No cranial nerve deficit. Normal muscle tone. Coordination normal.   Skin: Skin is warm and dry. No rash noted. Not diaphoretic. No erythema. No pallor.   Psychiatric: Normal mood and affect. Behavior is normal. Judgment and thought content normal.   Nursing note and vitals reviewed.      No results found for: HBA1C  Lab Results   Component Value Date/Time    SODIUM 140 04/04/2019 07:28 AM    POTASSIUM 4.5 04/04/2019 07:28 AM    CHLORIDE 104 04/04/2019 07:28 AM    CO2 20 04/04/2019 07:28 AM    GLUCOSE 88 04/04/2019 07:28 AM    BUN 14 04/04/2019 07:28 AM    CREATININE 0.91 04/04/2019 07:28 AM    " BUNCREATRAT 15 04/04/2019 07:28 AM    ALKPHOSPHAT 64 04/04/2019 07:28 AM    ASTSGOT 26 04/04/2019 07:28 AM    ALTSGPT 15 04/04/2019 07:28 AM    TBILIRUBIN 0.7 04/04/2019 07:28 AM     No results found for: INR  Lab Results   Component Value Date/Time    CHOLSTRLTOT 219 (H) 04/04/2019 07:28 AM     (H) 04/04/2019 07:28 AM    HDL 96 04/04/2019 07:28 AM    TRIGLYCERIDE 68 04/04/2019 07:28 AM       No results found for: TESTOSTERONE  Lab Results   Component Value Date/Time    TSH 1.830 04/04/2019 07:28 AM    TSH 1.970 11/17/2017 07:34 AM     No results found for: FREET4  No results found for: URICACID  No components found for: VITB12  Lab Results   Component Value Date/Time    25HYDROXY 44.4 09/12/2014 07:09 AM          Assessment/Plan:     1. Routine general medical examination at a health care facility- normal exam        1. LLQ abdominal pain- non tender abd w/o masses- r/o internal eisorder  New problem. Present for 2-3 months. Exacerbated with certain positions. Plan to evaluate with CT.    2. Lower abdominal pain  See above. Plan to evaluate with:    - CT-ABDOMEN WITH & W/O, PELVIS WITH; Future    3. Hypothyroidism due to acquired atrophy of thyroid  Under good control. Continue same regimen of Levothyroxine 75mcg.    - levothyroxine (SYNTHROID) 75 MCG Tab; TAKE ONE TABLET BY MOUTH DAILY -GENERICFOR SYNTHROID  Dispense: 90 Tab; Refill: 4    4. Pure hypercholesterolemia- mild no meds  Lipid panel completed on 4/4/19. Cholesterol 219; Triglycerides 68; HDL 96; . Under good control. Continue same regimen of high protein, low carb diet.    5. HX: breast cancer-1991; left mastectomy; 3 pos ax nodes; ER/HER +; chemo rx 6 mos, tamoxifen x 10 yr; no recurrence  Under good control. Continue same regimen.    6. Essential hypertension- borderline, no meds, white coat  Patient states that her BP at home is always below 130 systolic. She takes it just prior to her visit because she knows that it is always higher  when she comes to the doctor's office. She denies using salt in her food. Patient counseled to bring in a log of her blood pressure readings twice daily.    7. Elevated blood pressure reading in office with white coat syndrome, without diagnosis of hypertension  See above.      40 minute face-to-face encounter took place today.  More than half of this time was spent in the coordination of care of the above problems, as well as counseling.     I, Kameron Pelletier (Maitee), am scribing for, and in the presence of, Celestino Harrison M.D..    Electronically signed by: Kameron Pelletier (Marquitaibnora), 5/8/2019    I, Celestino Harrison M.D., personally performed the services described in this documentation, as scribed by Kameron Pelletier in my presence, and it is both accurate and complete.

## 2019-05-15 ENCOUNTER — HOSPITAL ENCOUNTER (OUTPATIENT)
Dept: RADIOLOGY | Facility: MEDICAL CENTER | Age: 64
End: 2019-05-15
Attending: INTERNAL MEDICINE
Payer: COMMERCIAL

## 2019-05-15 DIAGNOSIS — R10.30 LOWER ABDOMINAL PAIN: ICD-10-CM

## 2019-05-15 PROCEDURE — 74177 CT ABD & PELVIS W/CONTRAST: CPT

## 2019-05-15 PROCEDURE — 700117 HCHG RX CONTRAST REV CODE 255: Performed by: INTERNAL MEDICINE

## 2019-05-15 RX ADMIN — IOHEXOL 100 ML: 350 INJECTION, SOLUTION INTRAVENOUS at 13:44

## 2019-05-15 RX ADMIN — IOHEXOL 25 ML: 240 INJECTION, SOLUTION INTRATHECAL; INTRAVASCULAR; INTRAVENOUS; ORAL at 13:44

## 2019-06-11 ENCOUNTER — OFFICE VISIT (OUTPATIENT)
Dept: MEDICAL GROUP | Age: 64
End: 2019-06-11
Payer: COMMERCIAL

## 2019-06-11 VITALS
WEIGHT: 131.6 LBS | TEMPERATURE: 97.7 F | SYSTOLIC BLOOD PRESSURE: 142 MMHG | DIASTOLIC BLOOD PRESSURE: 100 MMHG | OXYGEN SATURATION: 99 % | BODY MASS INDEX: 21.15 KG/M2 | HEART RATE: 95 BPM | HEIGHT: 66 IN

## 2019-06-11 DIAGNOSIS — Z90.12 S/P MASTECTOMY, LEFT: ICD-10-CM

## 2019-06-11 DIAGNOSIS — R03.0 ELEVATED BLOOD PRESSURE READING IN OFFICE WITH WHITE COAT SYNDROME, WITHOUT DIAGNOSIS OF HYPERTENSION: ICD-10-CM

## 2019-06-11 DIAGNOSIS — Z85.3 HX: BREAST CANCER: ICD-10-CM

## 2019-06-11 DIAGNOSIS — I10 ESSENTIAL HYPERTENSION: ICD-10-CM

## 2019-06-11 DIAGNOSIS — E03.4 HYPOTHYROIDISM DUE TO ACQUIRED ATROPHY OF THYROID: ICD-10-CM

## 2019-06-11 DIAGNOSIS — E78.00 PURE HYPERCHOLESTEROLEMIA: ICD-10-CM

## 2019-06-11 PROBLEM — R10.32 LLQ ABDOMINAL PAIN: Status: RESOLVED | Noted: 2019-05-08 | Resolved: 2019-06-11

## 2019-06-11 PROCEDURE — 99215 OFFICE O/P EST HI 40 MIN: CPT | Performed by: INTERNAL MEDICINE

## 2019-06-11 ASSESSMENT — PATIENT HEALTH QUESTIONNAIRE - PHQ9: CLINICAL INTERPRETATION OF PHQ2 SCORE: 0

## 2019-06-11 ASSESSMENT — ENCOUNTER SYMPTOMS
PSYCHIATRIC NEGATIVE: 1
NEUROLOGICAL NEGATIVE: 1
MUSCULOSKELETAL NEGATIVE: 1
RESPIRATORY NEGATIVE: 1
EYES NEGATIVE: 1
GASTROINTESTINAL NEGATIVE: 1
CONSTITUTIONAL NEGATIVE: 1
CARDIOVASCULAR NEGATIVE: 1

## 2019-06-11 NOTE — PROGRESS NOTES
Subjective:      Taisha Franco is a 63 y.o. female who presents with Follow-Up and Groin Pain        HPI    The patient is here for followup of chronic medical problems listed below. The patient is compliant with medications and having no side effects from them. Denies chest pain, abdominal pain, dyspnea, myalgias, or cough.    Hypothyroidism  Patient is taking levothyroxine 75 mcg, which she is tolerating well without side effects.    Abdominal pain  Patient was previously complaining of left lower quadrant pain when seen in office on 5/8/19. She was referred to have a CT scan completed which showed no acute intra-abdominal findings, however was positive for diverticulosis, atherosclerosis and an irregular 1.4 cm hypodense lesion in the posterior right hepatic lobe, possibly a hemangioma. Today in office she states that her pain has resolved.    Dyslipidemia  Patient is managing through her own efforts of diet and exercise. Most recent lipid panel from 4/4/19 shows her total cholesterol was elevated at 219, and her LDL was elevated at 109. All other results are within normal limits.    Hypertension  Patient is managing through her own efforts of diet and exercise. During her last visit she stated that her blood pressure is always elevated during office visits and thus she takes it prior to coming in, and claims it is always below 130 systolic. Review of the pressure log brought in today shows results are consistent and not elevated. Pressures today in office are 142/100. Repeat pressures are 160/100.    History of breast cancer  Patient was diagnosed with left sided breast cancer. She underwent a left mastectmy, and did six months of chemotherapy. She has been in remission for over 10 years, and her last mammogram was performed yesterday. She is here requesting two mastectomy bras and a left breast prosthesis    Patient Active Problem List   Diagnosis   • Hypothyroidism due to acquired atrophy of thyroid   •  "Pure hypercholesterolemia- mild no meds   • HX: breast cancer-1991; left mastectomy; 3 pos ax nodes; ER/HER +; chemo rx 6 mos, tamoxifen x 10 yr; no recurrence   • Essential hypertension- borderline, no meds, white coat   • LLQ abdominal pain   • Elevated blood pressure reading in office with white coat syndrome, without diagnosis of hypertension       Outpatient Medications Prior to Visit   Medication Sig Dispense Refill   • levothyroxine (SYNTHROID) 75 MCG Tab TAKE ONE TABLET BY MOUTH DAILY -GENERICFOR SYNTHROID 90 Tab 4   • hydrocortisone 2.5 % Ointment Apply to affected area twice daily 30 g 0     No facility-administered medications prior to visit.         No Known Allergies    Review of Systems   Constitutional: Negative.    HENT: Negative.    Eyes: Negative.    Respiratory: Negative.    Cardiovascular: Negative.    Gastrointestinal: Negative.    Genitourinary: Negative.    Musculoskeletal: Negative.    Skin: Negative.    Neurological: Negative.    Endo/Heme/Allergies: Negative.    Psychiatric/Behavioral: Negative.    All other systems reviewed and are negative.           Objective:     /100 (BP Location: Right arm, Patient Position: Sitting, BP Cuff Size: Adult)   Pulse 95   Temp 36.5 °C (97.7 °F) (Temporal)   Ht 1.676 m (5' 6\")   Wt 59.7 kg (131 lb 9.6 oz)   LMP 06/11/1991   SpO2 99%   BMI 21.24 kg/m²     Physical Exam   Constitutional: Oriented to person, place, and time. Appears well-developed and well-nourished. No distress.   Head: Normocephalic and atraumatic.   Right Ear: External ear normal.   Left Ear: External ear normal.   Nose: Nose normal.   Mouth/Throat: Oropharynx is clear and moist. No oropharyngeal exudate.   Eyes: Pupils are equal, round, and reactive to light. Conjunctivae and EOM are normal. Right eye exhibits no discharge. Left eye exhibits no discharge. No scleral icterus.   Neck: Normal range of motion. Neck supple. No JVD present. No tracheal deviation present. No " thyromegaly present.   Cardiovascular: Normal rate, regular rhythm, normal heart sounds and intact distal pulses.  Exam reveals no gallop and no friction rub.    No murmur heard.  Pulmonary/Chest: Effort normal. No stridor. No respiratory distress. No wheezing or rales. No tenderness.   Abdominal: Soft. Bowel sounds are normal. No distension and no mass. There is no tenderness. There is no rebound and no guarding. No hernia.   Musculoskeletal: Normal range of motion No edema or tenderness.   Lymphadenopathy: No cervical adenopathy.   Neurological: Alert and oriented to person, place, and time. Normal reflexes. Normal reflexes. No cranial nerve deficit. Normal muscle tone. Coordination normal.   Skin: Skin is warm and dry. No rash noted. Not diaphoretic. No erythema. No pallor.   Psychiatric: Normal mood and affect. Behavior is normal. Judgment and thought content normal.   Nursing note and vitals reviewed.      No results found for: HBA1C  Lab Results   Component Value Date/Time    SODIUM 140 04/04/2019 07:28 AM    POTASSIUM 4.5 04/04/2019 07:28 AM    CHLORIDE 104 04/04/2019 07:28 AM    CO2 20 04/04/2019 07:28 AM    GLUCOSE 88 04/04/2019 07:28 AM    BUN 14 04/04/2019 07:28 AM    CREATININE 0.91 04/04/2019 07:28 AM    BUNCREATRAT 15 04/04/2019 07:28 AM    ALKPHOSPHAT 64 04/04/2019 07:28 AM    ASTSGOT 26 04/04/2019 07:28 AM    ALTSGPT 15 04/04/2019 07:28 AM    TBILIRUBIN 0.7 04/04/2019 07:28 AM     No results found for: INR  Lab Results   Component Value Date/Time    CHOLSTRLTOT 219 (H) 04/04/2019 07:28 AM     (H) 04/04/2019 07:28 AM    HDL 96 04/04/2019 07:28 AM    TRIGLYCERIDE 68 04/04/2019 07:28 AM       No results found for: TESTOSTERONE  Lab Results   Component Value Date/Time    TSH 1.830 04/04/2019 07:28 AM    TSH 1.970 11/17/2017 07:34 AM     No results found for: FREET4  No results found for: URICACID  No components found for: VITB12  Lab Results   Component Value Date/Time    25HYDROXY 44.4 09/12/2014  07:09 AM          Assessment/Plan:     1. HX: breast cancer-1991; left mastectomy; 3 pos ax nodes; ER/HER +; chemo rx 6 mos, tamoxifen x 10 yr; no recurrence  - Continue to perform routine screening. Will write for a left breast prosthesis as well as two mastectomy bras.  - Misc. Devices Misc; 2 mastectomy bras and left breast prosthesis  Dispense: 2 Each; Refill: 1    2. Hypothyroidism due to acquired atrophy of thyroid  - Under good control. Continue same regimen of levothyroxine. Plan to check TSH prior to next office visit.   - TSH; Future    3. Pure hypercholesterolemia- mild no meds  - Under good control. Patient will continue to manage through her own efforts of diet and exercise. Plan for fasting labs prior to next office visit to continue to monitor.  - Comp Metabolic Panel; Future  - Lipid Profile; Future  - CBC WITH DIFFERENTIAL; Future    4. Essential hypertension- borderline, no meds, white coat  - Under good control. Patient will continue to manage through her own efforts of diet and exercise.    5. Elevated blood pressure reading in office with white coat syndrome, without diagnosis of hypertension  - See #4    6. S/P mastectomy, left  - See #1.  - Misc. Devices Misc; 2 mastectomy bras and left breast prosthesis  Dispense: 2 Each; Refill: 1    40 minute face-to-face encounter took place today.  More than half of this time was spent in the coordination of care of the above problems, as well as counseling.     Gary CHRISTINA (Travis), am scribing for, and in the presence of, Celestino Harrison M.D..    Electronically signed by: Gary Gutiérrez (Travis), 6/11/2019    Celestino CHRISTINA M.D., personally performed the services described in this documentation, as scribed by Gary Gutiérrez in my presence, and it is both accurate and complete.

## 2019-07-08 ENCOUNTER — HOSPITAL ENCOUNTER (OUTPATIENT)
Dept: RADIOLOGY | Facility: MEDICAL CENTER | Age: 64
End: 2019-07-08
Attending: INTERNAL MEDICINE
Payer: COMMERCIAL

## 2019-07-08 DIAGNOSIS — Z12.31 SCREENING MAMMOGRAM, ENCOUNTER FOR: ICD-10-CM

## 2019-07-08 PROCEDURE — 77063 BREAST TOMOSYNTHESIS BI: CPT

## 2021-03-03 DIAGNOSIS — Z23 NEED FOR VACCINATION: ICD-10-CM

## 2022-06-16 NOTE — PATIENT INSTRUCTIONS
Flonase: 2 sprays per nostril once daily  Claritin: 1-2 tablet once daily  Saline irrigation: 3-4 times daily.    Detail Level: Detailed Render Post-Care Instructions In Note?: no Size Of Margin In Cm (Margins Are Not Added To Billing Dimensions): 0.2 Path Notes (To The Dermatopathologist): Check margins Medical Necessity Information: It is in your best interest to select a reason for this procedure from the list below. All of these items fulfill various CMS LCD requirements except the new and changing color options. Biopsy Method: Dermablade Billing Type: Third-Party Bill Anesthesia Type: 1% lidocaine with epinephrine Wound Care: Petrolatum Size Of Lesion In Cm (Required): 0.6 Consent was obtained from the patient. The risks and benefits to therapy were discussed in detail. Specifically, the risks of infection, scarring, bleeding, prolonged wound healing, incomplete removal, allergy to anesthesia, nerve injury and recurrence were addressed. Prior to the procedure, the treatment site was clearly identified and confirmed by the patient. All components of Universal Protocol/PAUSE Rule completed. Notification Instructions: Patient will be notified of pathology results. However, patient instructed to call the office if not contacted within 2 weeks. Hemostasis: Drysol Medical Necessity Clause: This procedure was medically necessary because the lesion that was treated was: Post-Care Instructions: I reviewed with the patient in detail post-care instructions. Patient is to keep the biopsy site dry overnight, and then apply bacitracin twice daily until healed. Patient may apply hydrogen peroxide soaks to remove any crusting. Size Of Lesion In Cm (Required): 0.7 Was A Bandage Applied: Yes